# Patient Record
Sex: MALE | URBAN - METROPOLITAN AREA
[De-identification: names, ages, dates, MRNs, and addresses within clinical notes are randomized per-mention and may not be internally consistent; named-entity substitution may affect disease eponyms.]

---

## 2021-01-28 ENCOUNTER — APPOINTMENT (OUTPATIENT)
Age: 41
Setting detail: DERMATOLOGY
End: 2021-01-29

## 2021-01-28 DIAGNOSIS — L82.1 OTHER SEBORRHEIC KERATOSIS: ICD-10-CM

## 2021-01-28 PROBLEM — D23.71 OTHER BENIGN NEOPLASM OF SKIN OF RIGHT LOWER LIMB, INCLUDING HIP: Status: ACTIVE | Noted: 2021-01-28

## 2021-01-28 PROBLEM — D23.4 OTHER BENIGN NEOPLASM OF SKIN OF SCALP AND NECK: Status: ACTIVE | Noted: 2021-01-28

## 2021-01-28 PROCEDURE — OTHER REASSURANCE: OTHER

## 2021-01-28 PROCEDURE — OTHER COUNSELING: OTHER

## 2021-01-28 PROCEDURE — OTHER MIPS QUALITY: OTHER

## 2021-01-28 PROCEDURE — 99202 OFFICE O/P NEW SF 15 MIN: CPT

## 2021-01-28 ASSESSMENT — LOCATION ZONE DERM: LOCATION ZONE: TRUNK

## 2021-01-28 ASSESSMENT — LOCATION DETAILED DESCRIPTION DERM: LOCATION DETAILED: EPIGASTRIC SKIN

## 2021-01-28 ASSESSMENT — LOCATION SIMPLE DESCRIPTION DERM: LOCATION SIMPLE: ABDOMEN

## 2021-01-28 NOTE — PROCEDURE: MIPS QUALITY
Quality 110: Preventive Care And Screening: Influenza Immunization: Influenza immunization was not ordered or administered, reason not given
Quality 130: Documentation Of Current Medications In The Medical Record: Current Medications Documented
Quality 134: Screening For Clinical Depression And Follow-Up Plan: The patient was screened for depression and the screen was negative and no follow up required
Quality 431: Preventive Care And Screening: Unhealthy Alcohol Use - Screening: Patient screened for unhealthy alcohol use using a single question and scores less than 2 times per year
Quality 226: Preventive Care And Screening: Tobacco Use: Screening And Cessation Intervention: Patient screened for tobacco use and is an ex/non-smoker
Detail Level: Detailed
Additional Notes: Due to COVID-19, at today’s office visit we utilized face masks, wipes, and other additional supplies,materials, and clinical staff time over and above those usually included in an office visit, which was performed during the Coronavirus-related Public Health Emergency.
Quality 128: Preventive Care And Screening: Body Mass Index (Bmi) Screening And Follow-Up Plan: BMI is documented within normal parameters and no follow-up plan is required.

## 2022-10-07 ENCOUNTER — APPOINTMENT (RX ONLY)
Dept: URBAN - METROPOLITAN AREA CLINIC 41 | Facility: CLINIC | Age: 42
Setting detail: DERMATOLOGY
End: 2022-10-07

## 2022-10-07 DIAGNOSIS — L30.8 OTHER SPECIFIED DERMATITIS: ICD-10-CM | Status: INADEQUATELY CONTROLLED

## 2022-10-07 PROCEDURE — ? ORDER TESTS

## 2022-10-07 PROCEDURE — 99203 OFFICE O/P NEW LOW 30 MIN: CPT

## 2022-10-07 PROCEDURE — 87220 TISSUE EXAM FOR FUNGI: CPT

## 2022-10-07 PROCEDURE — ? ADDITIONAL NOTES

## 2022-10-07 PROCEDURE — ? COUNSELING

## 2022-10-07 PROCEDURE — ? PRESCRIPTION MEDICATION MANAGEMENT

## 2022-10-07 PROCEDURE — ? KOH PREP

## 2022-10-07 PROCEDURE — ? PRESCRIPTION

## 2022-10-07 RX ORDER — CLOBETASOL PROPIONATE 0.5 MG/G
CREAM TOPICAL
Qty: 45 | Refills: 1 | Status: ERX | COMMUNITY
Start: 2022-10-07

## 2022-10-07 RX ADMIN — CLOBETASOL PROPIONATE: 0.5 CREAM TOPICAL at 00:00

## 2022-10-07 ASSESSMENT — LOCATION DETAILED DESCRIPTION DERM
LOCATION DETAILED: RIGHT THENAR EMINENCE
LOCATION DETAILED: RIGHT RADIAL DORSAL HAND
LOCATION DETAILED: LEFT ULNAR DORSAL HAND

## 2022-10-07 ASSESSMENT — LOCATION ZONE DERM: LOCATION ZONE: HAND

## 2022-10-07 ASSESSMENT — LOCATION SIMPLE DESCRIPTION DERM
LOCATION SIMPLE: LEFT HAND
LOCATION SIMPLE: RIGHT HAND

## 2022-10-07 NOTE — HPI: RASH
Is This A New Presentation, Or A Follow-Up?: Rash
Additional History: New Pt here. Pt presents with concerns of a patch on his right wrist lasting 1 month. Pt notes the lesion is itchy and growing. Pt reports using OTC Lamisil on the area thinking that his condition was ringworm but has not seen lasting results.

## 2022-10-07 NOTE — HPI: ECZEMA (PATIENT REPORTED)
Where Is Your Eczema Located?: Hands
Additional Comments (Use Complete Sentences): New Pt here. Pt presents with concerns of eczema on both his hands lasting years. Pt reports he has been consistently using Clobetasol to manage his condition on his hands his whole life and needs a refill.

## 2022-10-07 NOTE — PROCEDURE: PRESCRIPTION MEDICATION MANAGEMENT
Render In Strict Bullet Format?: No
Detail Level: Zone
Initiate Treatment: Clobetasol cream or ointment bid x two weeks for flares on hands under occlusion

## 2022-10-07 NOTE — PROCEDURE: ORDER TESTS
Bill For Surgical Tray: no
Performing Laboratory: 0
Billing Type: Third-Party Bill
Expected Date Of Service: 10/07/2022

## 2022-10-25 ENCOUNTER — NEW PATIENT (OUTPATIENT)
Dept: URBAN - METROPOLITAN AREA CLINIC 49 | Facility: CLINIC | Age: 42
End: 2022-10-25

## 2022-10-25 VITALS — SYSTOLIC BLOOD PRESSURE: 189 MMHG | DIASTOLIC BLOOD PRESSURE: 116 MMHG

## 2022-10-25 DIAGNOSIS — H34.8320: ICD-10-CM

## 2022-10-25 PROCEDURE — 92134 CPTRZ OPH DX IMG PST SGM RTA: CPT

## 2022-10-25 PROCEDURE — 99204 OFFICE O/P NEW MOD 45 MIN: CPT

## 2022-10-25 PROCEDURE — 92235 FLUORESCEIN ANGRPH MLTIFRAME: CPT

## 2022-10-25 PROCEDURE — 92202 OPSCPY EXTND ON/MAC DRAW: CPT

## 2022-10-25 ASSESSMENT — TONOMETRY
OD_IOP_MMHG: 17
OS_IOP_MMHG: 17

## 2022-10-25 ASSESSMENT — VISUAL ACUITY
OS_SC: 20/30+2
OD_SC: 20/20-1
OS_PH: 20/25-2

## 2022-11-08 ENCOUNTER — PROCEDURE ONLY (OUTPATIENT)
Dept: URBAN - METROPOLITAN AREA CLINIC 49 | Facility: CLINIC | Age: 42
End: 2022-11-08

## 2022-11-08 DIAGNOSIS — I10: ICD-10-CM

## 2022-11-08 DIAGNOSIS — H34.8320: ICD-10-CM

## 2022-11-08 PROCEDURE — 92134 CPTRZ OPH DX IMG PST SGM RTA: CPT

## 2022-11-08 PROCEDURE — 99214 OFFICE O/P EST MOD 30 MIN: CPT

## 2022-11-08 ASSESSMENT — VISUAL ACUITY
OS_PH: 20/20
OS_SC: 20/25

## 2022-11-08 ASSESSMENT — TONOMETRY: OS_IOP_MMHG: 19

## 2022-12-06 ENCOUNTER — FOLLOW UP (OUTPATIENT)
Dept: URBAN - METROPOLITAN AREA CLINIC 49 | Facility: CLINIC | Age: 42
End: 2022-12-06

## 2022-12-06 DIAGNOSIS — I10: ICD-10-CM

## 2022-12-06 DIAGNOSIS — H34.8320: ICD-10-CM

## 2022-12-06 PROCEDURE — 92134 CPTRZ OPH DX IMG PST SGM RTA: CPT

## 2022-12-06 PROCEDURE — 92014 COMPRE OPH EXAM EST PT 1/>: CPT

## 2022-12-06 PROCEDURE — 92202 OPSCPY EXTND ON/MAC DRAW: CPT

## 2022-12-06 ASSESSMENT — TONOMETRY
OS_IOP_MMHG: 12
OD_IOP_MMHG: 13

## 2022-12-06 ASSESSMENT — VISUAL ACUITY
OS_SC: 20/25-2
OD_PH: 20/20-1
OD_SC: 20/30+2

## 2023-01-17 ENCOUNTER — FOLLOW UP (OUTPATIENT)
Dept: URBAN - METROPOLITAN AREA CLINIC 49 | Facility: CLINIC | Age: 43
End: 2023-01-17

## 2023-01-17 DIAGNOSIS — H34.8320: ICD-10-CM

## 2023-01-17 DIAGNOSIS — I10: ICD-10-CM

## 2023-01-17 PROCEDURE — 92202 OPSCPY EXTND ON/MAC DRAW: CPT

## 2023-01-17 PROCEDURE — 92134 CPTRZ OPH DX IMG PST SGM RTA: CPT

## 2023-01-17 PROCEDURE — 92014 COMPRE OPH EXAM EST PT 1/>: CPT

## 2023-01-17 ASSESSMENT — TONOMETRY
OS_IOP_MMHG: 20
OD_IOP_MMHG: 21

## 2023-01-17 ASSESSMENT — VISUAL ACUITY
OD_SC: 20/20
OS_SC: 20/20

## 2023-03-20 ENCOUNTER — FOLLOW UP (OUTPATIENT)
Dept: URBAN - METROPOLITAN AREA CLINIC 49 | Facility: CLINIC | Age: 43
End: 2023-03-20

## 2023-03-20 DIAGNOSIS — I10: ICD-10-CM

## 2023-03-20 DIAGNOSIS — H34.8320: ICD-10-CM

## 2023-03-20 PROCEDURE — 92014 COMPRE OPH EXAM EST PT 1/>: CPT

## 2023-03-20 PROCEDURE — 92134 CPTRZ OPH DX IMG PST SGM RTA: CPT

## 2023-03-20 PROCEDURE — 92202 OPSCPY EXTND ON/MAC DRAW: CPT

## 2023-03-20 ASSESSMENT — TONOMETRY
OS_IOP_MMHG: 20
OD_IOP_MMHG: 21

## 2023-03-20 ASSESSMENT — VISUAL ACUITY
OS_SC: 20/20-2
OD_SC: 20/20-2

## 2023-07-12 ENCOUNTER — FOLLOW UP (OUTPATIENT)
Dept: URBAN - METROPOLITAN AREA CLINIC 49 | Facility: CLINIC | Age: 43
End: 2023-07-12

## 2023-07-12 DIAGNOSIS — I10: ICD-10-CM

## 2023-07-12 DIAGNOSIS — H34.8320: ICD-10-CM

## 2023-07-12 PROCEDURE — 92134 CPTRZ OPH DX IMG PST SGM RTA: CPT

## 2023-07-12 PROCEDURE — 92202 OPSCPY EXTND ON/MAC DRAW: CPT

## 2023-07-12 PROCEDURE — 92014 COMPRE OPH EXAM EST PT 1/>: CPT | Mod: 25

## 2023-07-12 ASSESSMENT — VISUAL ACUITY: OS_SC: 20/20-2

## 2023-07-12 ASSESSMENT — TONOMETRY: OS_IOP_MMHG: 17

## 2024-01-15 ENCOUNTER — FOLLOW UP (OUTPATIENT)
Dept: URBAN - METROPOLITAN AREA CLINIC 49 | Facility: CLINIC | Age: 44
End: 2024-01-15

## 2024-01-15 DIAGNOSIS — I10: ICD-10-CM

## 2024-01-15 DIAGNOSIS — H34.8320: ICD-10-CM

## 2024-01-15 PROCEDURE — 92134 CPTRZ OPH DX IMG PST SGM RTA: CPT

## 2024-01-15 PROCEDURE — 92014 COMPRE OPH EXAM EST PT 1/>: CPT

## 2024-01-15 PROCEDURE — 92202 OPSCPY EXTND ON/MAC DRAW: CPT

## 2024-01-15 ASSESSMENT — TONOMETRY
OD_IOP_MMHG: 16
OS_IOP_MMHG: 20

## 2024-01-15 ASSESSMENT — VISUAL ACUITY
OD_SC: 20/20-2
OS_SC: 20/20

## 2024-07-15 ENCOUNTER — FOLLOW UP (OUTPATIENT)
Dept: URBAN - METROPOLITAN AREA CLINIC 49 | Facility: CLINIC | Age: 44
End: 2024-07-15

## 2024-07-15 DIAGNOSIS — H34.8320: ICD-10-CM

## 2024-07-15 DIAGNOSIS — I10: ICD-10-CM

## 2024-07-15 PROCEDURE — 92202 OPSCPY EXTND ON/MAC DRAW: CPT

## 2024-07-15 PROCEDURE — 92134 CPTRZ OPH DX IMG PST SGM RTA: CPT

## 2024-07-15 PROCEDURE — 92014 COMPRE OPH EXAM EST PT 1/>: CPT

## 2024-07-15 ASSESSMENT — VISUAL ACUITY
OS_SC: 20/20
OD_SC: 20/20

## 2024-07-15 ASSESSMENT — TONOMETRY
OS_IOP_MMHG: 14
OD_IOP_MMHG: 15

## 2025-02-06 ENCOUNTER — APPOINTMENT (OUTPATIENT)
Dept: URBAN - METROPOLITAN AREA CLINIC 41 | Facility: CLINIC | Age: 45
Setting detail: DERMATOLOGY
End: 2025-02-06

## 2025-02-06 DIAGNOSIS — L71.0 PERIORAL DERMATITIS: ICD-10-CM | Status: INADEQUATELY CONTROLLED

## 2025-02-06 PROCEDURE — 99213 OFFICE O/P EST LOW 20 MIN: CPT

## 2025-02-06 PROCEDURE — ? PRESCRIPTION

## 2025-02-06 PROCEDURE — ? PRESCRIPTION MEDICATION MANAGEMENT

## 2025-02-06 PROCEDURE — ? COUNSELING

## 2025-02-06 RX ORDER — DOXYCYCLINE HYCLATE 100 MG/1
CAPSULE, GELATIN COATED ORAL
Qty: 60 | Refills: 0 | Status: ERX | COMMUNITY
Start: 2025-02-06

## 2025-02-06 RX ADMIN — DOXYCYCLINE HYCLATE: 100 CAPSULE, GELATIN COATED ORAL at 00:00

## 2025-02-06 ASSESSMENT — LOCATION SIMPLE DESCRIPTION DERM
LOCATION SIMPLE: RIGHT CHEEK
LOCATION SIMPLE: LEFT CHEEK

## 2025-02-06 ASSESSMENT — LOCATION DETAILED DESCRIPTION DERM
LOCATION DETAILED: RIGHT SUPERIOR CENTRAL MALAR CHEEK
LOCATION DETAILED: LEFT SUPERIOR CENTRAL MALAR CHEEK

## 2025-02-06 ASSESSMENT — LOCATION ZONE DERM: LOCATION ZONE: FACE

## 2025-02-06 NOTE — HPI: RASH
Is This A New Presentation, Or A Follow-Up?: Rash
Additional History: Est ot\\nLast saw AF 2 1/2 yrs ago\\nIrritated skin at corners of eyes since December\\nNo changes in products or detergents\\nComes and goes\\nSpreading outward\\nMay be related to CPAp\\nUses Clobetasol to help w flares\\nLow level of pain\\n\\nDecline Skin check

## 2025-07-16 ENCOUNTER — FOLLOW UP (OUTPATIENT)
Dept: URBAN - METROPOLITAN AREA CLINIC 49 | Facility: CLINIC | Age: 45
End: 2025-07-16

## 2025-07-16 DIAGNOSIS — I10: ICD-10-CM

## 2025-07-16 DIAGNOSIS — H34.8320: ICD-10-CM

## 2025-07-16 PROCEDURE — 92202 OPSCPY EXTND ON/MAC DRAW: CPT

## 2025-07-16 PROCEDURE — 92014 COMPRE OPH EXAM EST PT 1/>: CPT

## 2025-07-16 PROCEDURE — 92134 CPTRZ OPH DX IMG PST SGM RTA: CPT

## 2025-07-16 ASSESSMENT — TONOMETRY
OS_IOP_MMHG: 18
OD_IOP_MMHG: 16

## 2025-07-16 ASSESSMENT — VISUAL ACUITY
OS_SC: 20/20
OD_SC: 20/20